# Patient Record
Sex: MALE | Race: BLACK OR AFRICAN AMERICAN | NOT HISPANIC OR LATINO | URBAN - METROPOLITAN AREA
[De-identification: names, ages, dates, MRNs, and addresses within clinical notes are randomized per-mention and may not be internally consistent; named-entity substitution may affect disease eponyms.]

---
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
## 2020-01-06 ENCOUNTER — COMMUNICATION - HEALTHEAST (OUTPATIENT)
Dept: SCHEDULING | Facility: CLINIC | Age: 20
End: 2020-01-06

## 2021-06-05 NOTE — TELEPHONE ENCOUNTER
"Pt's girlfriend Emmanuel reports pt is having severe shakes and \"sweating bullets\". No history on pt. Pt does not have PCP per Emmanuel. Emmanuel sounds upset to Writer and does not know what to do.     See Care Advice and disposition.     Emmanuel agrees to plan.     Reason for Disposition    Sounds like a life-threatening emergency to the triager    Protocols used: [id number]      "

## 2021-06-13 NOTE — PROGRESS NOTES
Late Entry- Note for the week of 10/23/17-10/27/17    Weekly Progress Note  Lena Cortés  2000  [id number]      D) Pt attended 2 groups  this week with 0 absences. A) Staff facilitated groups and reviewed tx progress. R) Patient has not yet received his treatment plan as he began programming this week. He will be presented with his treatment plan within 7 days of his intake. Pt working on the following dimensions:      Dimension #1 - Withdrawal Potential - Risk 0. No Concern.  Specific goals from treatment plan addressed this week:  Patient to remain abstinent.   Effectiveness of strategies:  Patient began group this week, but has not reported and relapses or use that was not reported on his intake/assessment.       Dimension #2 - Biomedical - Risk 0. No Concern.   Specific goals from treatment plan addressed this week:  Patient to maintain his good health.   Effectiveness of strategies:  Patient appears to be in good physical health and functioning at this time.   He has not reported any health concerns at this time.   Patient continues to be under the care of [facility].       Dimension #3 - Emotional/Behavioral/Cognitive - Risk 1. Mild concern. .  Specific goals from treatment plan addressed this week:  Patient has not received his treatment plan, and so does not have any assignments to complete in this dimension.   He was able to maintain appropriate behavior throughout both groups this week.   Effectiveness of strategies:  NA      Dimension #4 - Treatment Acceptance/Resistance - Risk 1. Mild Concern.   Specific goals from treatment plan addressed this week:  Patient to attend 2 groups per week, for the full time provided.   Patient to actively engage in group.   Effectiveness of strategies:  Patient attended both groups this week, for the full time both days.   Patient was attentive and actively engaged in the group discussion and video. He does at times show that he does not want to be in group,  but is able to engage and participate despite this.       Dimension #5 - Relapse Potential - Risk 3. Serious Concern.   Specific goals from treatment plan addressed this week:  Patient has not yet received his treatment plan, and so does not yet have any assignments to complete in this dimension.   Effectiveness of strategies:  NA      Dimension #6 - Recovery Environment - Risk 3. Serious Concern.   Specific goals from treatment plan addressed this week:  Patient has not yet received his treatment plan, and so does not yet have any assignments to complete in this dimension.   Effectiveness of strategies:  NA      T) Treatment plan updated NA.  Patient notified and in agreement NA.  Patient educated on Drug Categories & [employer]; Heroin, [employer] Story video. Patient has completed 4 program hours at this time. Projected discharge date is 2/2/18.     KRISTEN Nye  10/27/2017      Psycho-Educational Curriculum Date Attended Psycho-Educational Curriculum Date Attended   Acceptance                   Mental Health                            Relapse          Sober Structure            Medical Aspects        Drug Categories 10/24/17                       Educational Videos        Turning Point       NG: Heroin; [employer] Story 10/27/17      NG: Marijuana        NG: Cocaine     Relationships   NG: Ecstasy       On the Outs        Which Brain Do You Want        DUI: Dead in 5 Seconds       Intervention:        Intervention:       Intervention:        Intervention:        20/20: A Deadly Drunk Driving Accident        Drunk in Public     Feelings   Addiction      20/20: Intoxication Nation        Unguarded        NG: World's Most Dangerous Drug

## 2021-06-13 NOTE — PROGRESS NOTES
Met with patient individually. He received his treatment plan, and signed with no changes made.    KRISTEN Nye  10/31/2017

## 2021-06-13 NOTE — PROGRESS NOTES
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 Always supportive   Child(veronica)    Other relative(s) Always supportive   Friend(s)/neighbor(s) Never supportive   Child(veronica) s father(s)/mother(s)    Support group member(s)    Community of jessenia members    /counselor/therapist/healer Always supportive   Other (specify) Probation Always supportive     8A. What is your current living situation?       Patient is currently placed at the [facility] in the [facility] program.      8B. What is your long term plan for where you will be living?      Patient plans to live with his mother and younger brother.     8C. Tell me about your living environment/neighborhood? Ask enough follow up questions to determine safety, criminal activity, availability of alcohol and drugs, supportive or antagonistic to the person making changes.       He reports that he was living in [facility] where crime and substance use are prevalent.     9. Criminal justice history: Gather current/recent history and any significant history related to substance use--Arrests? Convictions? Circumstances? Alcohol or drug involvement? Sentences? Still on probation or parole? Expectations of the court? Current court order? Any sex offenses - lifetime? What level? (DSM)       aggravated robbery, and criminal sexual misconduct.     10.  What obstacles exist to participating in treatment? (Time off work, childcare, funding, transportation, pending longterm time, living situation)       NA    Dimension VI Ratings   Recovery environment - The placing authority must use the criteria in Dimension VI to determine a client s recovery environment.   RISK DESCRIPTIONS - Severity rating: 3-Patient is not engaged in structured, meaningful activity and the patient's peers, family , significant other, and living environment are unsupportive, or there is significant criminal justice system involvement.    REASONS SEVERITY WAS ASSIGNED - Patient was not regularly attending school in the community, and so he appears to lack structured and meaningful activity as he reports that most of his time was spent using or selling drugs. Patient is currently in a court ordered out of home placement at the [facility] due to his offense. He will remain on probation following his discharge from the program. Patient reports that his mother is supportive of him being sober, but he does not appear to have any positive peer support at this time as most of his friends use as well as his brother and cousin. Patient also reports that he as living in an area of high crime and use.          Client Choice/Exceptions     Would you like services specific to language, age, gender, culture, Oriental orthodox preference, race, ethnicity, sexual orientation or disability?  yes    If yes, specify:  Client is open to diverse groups.      What particular treatment choices and options would you like to have?  male, Adolescent     Do you have a preference for a particular treatment program?  [facility]    DSM-V Criteria for Substance Abuse  Instructions  Determine whether the client currently meets the criteria for a Substance Use Disorder using the diagnostic criteria in the DSM-V, pp. 481-589. Current means during the most recent 12 months outside a facility that controls access to substances.    Category of  substance Severity ICD-10 Code/DSM V Code   []  Alcohol Use Disorder [] Mild  [] Moderate  [] Severe [] (F10.10) (305.00)  [] (F10.20) (303.90)  [] (F10.20) (303.90)   [x]  Cannabis Use Disorder [] Mild  [] Moderate  [x] Severe [] (F12.10) (305.20)  [] (F12.20) (304.30)  [x] (F12.20) (304.30)   [] Hallucinogen Use Disorder [] Mild  [] Moderate  [] Severe [] (F16.10) (305.30)  [] (F16.20) (304.50)  [] (F16.20) (304.50)   []  Inhalant Use Disorder [] Mild  [] Moderate  [] Severe [] (F18.10) (305.90)  [] (F18.20) (304.60)  [] (F18.20) (304.60)   []  Opioid Use Disorder [] Mild  [] Moderate  [] Severe [] (F11.10) (305.50)  [] (F11.20) (304.00)  [] (F11.20) (304.00)   []  Sedative, Hypnotic, or Anxiolytic Use Disorder [] Mild  [] Moderate  [] Severe [] (F13.10) (305.40)   [] (F13.20) (304.10)  [] (F13.20) (304.10)   []  Stimulant Related Disorders [] Mild  [] Moderate  [] Severe [] (F15.10) (305.70) Amphetamine type substance  [] (F14.10) (305.60) Cocaine  [] (F15.10) (305.70) Other or unspecified stimulant  [] (F15.20) (304.40) Amphetamine type substance  [] (F14.20) (304.20) Cocaine  [] (F15.20) (304.40) Other or unspecified stimulant  [] (F15.20) (304.40) Amphetamine type substance  [] (F14.20) (304.20) Cocaine  [] (F15.20) (304.40) Other or unspecified stimulant   []  Tobacco use Disorder [] Mild  [] Moderate  [] Severe [] (Z72.0) (305.1)  [] (F17.200) (305.1)  [] (F17.200) (305.1)   []  Other (or unknown) Substance Use Disorder [] Mild  [] Moderate  [] Severe [] (F19.10) (305.90)  [] (F19.20) (304.90)  [] (F19.20) (304.90)       Suggested Level of Care Necessary for Recovery  []  Inpatient  []  Extended Care []  Residential [x]  Outpatient  []  None      Collateral Contact Summary   Number of contacts made:  2  Contact with referring person:  yes     If court related records were reviewed, summarize here:  Patient does not have any UAs on file. The information presented in his YLS indicates low risk for substance  use however it also indicates no substance use which contradicts patient and collateral report.      [x]   Information from collateral contacts supported/largely agreed with information from the client and associated risk ratings.   []   Information from collateral contacts was significantly different from information from the client and lead to different risk ratings.      Summarize new information here:      Rule 25 Assessment Summary and Plan   's Recommendation    Patient meets DSM IV criteria for Cannabis Use Disorder, Severe (F12.20).  Recommendation is for this patient to attend the [facility] program while at the [facility] and follow aftercare recommendations once programming is complete.      Collateral Contacts     Please duplicate this page for each contact.  If this includes information which is sensitive and not public, separate this page from the rest of the assessment before sharing.  Retain the page in the assessment file.   Name    Misty Thaissantiago Relationship    SW/Primary Therapist @ [facility] Phone Number    [phone] Releases    yes       Information Provided:      Misty reported the following:    Patient reported his first use of marijuana at age 12.   By age 16 the patient reports that he was smoking 1 blunt per day.   By age 17 he reports smoking 2 blunts per day, and more if he was with friends.   Marijuana use may have contributed to his offending behavior and other criminal behaviors, which the patient lacks insight into.   Believes that the patient could benefit from the creation of specific and structured plan for how to abstain from substance use when he is released from the [facility].     Collateral Contacts     Name    Ignacia Lovell   Relationship    Mother Phone Number    [phone] Releases    yes       Information Provided:      Ignacia reported the following:    She is aware that the patient was using marijuana.   She is unsure of how much or how often he was using.   She  expressed no other concerns in regards to his substance use at this time.          Staff Name and Title:  [facility]  Date: 10/18/2017  Time:  10:15 AM

## 2021-06-13 NOTE — PROGRESS NOTES
Addiction Services - Initial Services Plan   Name:Lena Cortés   : 2000   MRN: [id number]       Patient describes their immediate need: To learn sober living skills to prevent relapse. Patient denies any other immediate needs at this time.     Are there any immediate Safety Needs such as (physical, stability, mobility): Patient is currently under the care of [facility]. Pt is able to get medical care as needed. Pt denies immediate concerns.     Immediate Health Needs and Plan:   Remain clean and sober and attend first group therapy session on 10/24/17.    Vulnerable Adult: NA     [ ] Continue Current Medications for:    [ ] Request Consult for:   [ ] Notify Attending Physician about:   [ ] Other:     Issues to be addressed i the first sessions:   Treatment planning, orientation to group norms and rules, and welcomed by peers. Complete Substance Use History packet, and new group member worksheet.     Patient strengths and needs:   Strengths identified as caring, respectful, hard working, good sense of style, sense of humor.   Needs identified as communication, anger, and letting go of things.     Plan for patient for time between intake and completion of the treatment plan:   Attend all group therapy sessions as directed, complete all written and oral assignments as directed, and remain clean and sober. A relapse, if any, must be reported to staff immediately in order to ensure you are receiving the proper level of care.    Staff Members' Titles authorized to Initiate Services are:   Director of Behavioral Service   Clinical Director of Chemical Dependency   [facility] Counselor   MI/CD Counselor        Vulnerable Adult Review   [X] Review of the facility Abuse Prevention plan was reviewed with the patient   [X] No individual abuse plan is necessary   [ ] In addition to the facility Abuse Prevention plan, an Individual Abuse Plan will be put in place     I understand these goals to be the  Treatment Goals of the Program, and I agree to the stated Methods in attempting to accomplish these goals.     Patient Signature: _________________________Date: 10/23/2017       Staff Name/Title: KIRSTEN Nye Date: 10/23/2017   Time: 1:52 PM

## 2021-06-13 NOTE — PROGRESS NOTES
"Intake Note:   D) Lena Cortés is a 17 y.o.  single Black or  male who is referred to AIOP via WILL Pelaez/Primary Therapist at [facility], with funding from [facility]. Patient orientated x 3. Patient meets criteria for Cannabis Use Disorder, Severe (F12.20).  Patient appears appropriate for AIOP.   A) Met with patient for 40 minutes.  Completed intake assessment and preliminary paperwork. Patient was given and explained counselor & supervisor license number and contact info, Patient Bill of Rights, program rules/regulations, Program Abuse Prevention Plan, confidentiality & HIPPA policies, grievance procedure, presented ROIs, TB & HIV/AIDS policies & resources, and Vulnerable Adult policy.   Conducted Vulnerable Adult Assessment.   R)No special Vulnerable Adult needed at this time.  Patient signed and agreed to counselor & supervisor license number and contact info., Patient Bill of Rights, group rules/regulations, Program Abuse Prevention Plan, confidentiality & HIPPA policies, grievance procedure,  ROIs, TB & HIV/AIDS policies & resources, and Vulnerable Adult policy. Patient scored Lower Risk on PANSI screen. Patient denied suicidal ideation/intent/plan/means at this time.     Opioid Use Disorder: No   Provided \"Options for Opioid Treatment in [place] and Overdose Prevention\" No     Dimension #1 - Withdrawal Potential - Risk 0. No Concern.   Patient reports a history of daily marijuana & nicotine use, with his last reported use being on 3/9/17, the day of his out of home placement. He denies any current withdrawal symptoms, and does not appear to be at risk of withdrawal at this time.     Dimension #2 - Biomedical - Risk 0. No Concern.   Patient is currently under the care of [facility] medical unit for all medical issues. Patient denies any health concerns at this time. He appears to be in good physical health and functioning for a young man of his age. "     Dimension #3 - Emotional , Behavioral and Cognitive - Risk 1. Mild Concern.    Patient denies any mental health diagnosis at this time. He does however appear to greatly struggle with impulse control as evidence by his offense that led to his out of home placement. He denies any mental health medications for services at this time. Patient denies any suicidal thoughts or plans. He denies symptoms of mental health. Per his records the only mental health disorder he may have is a conduct disorder. He has been working to meet program expectations while at the [facility] [employer], and his behaviors have been improving over time. Patient is oriented x4.     Dimension #4 - Treatment Resistance - Risk 1. Mild Concern.   Patient reports that he does not believe that he has a problem with substance use, but that he is willing to stop using at this time. He therefore appears ambivalent at this time about his need for change. He was calm and cooperative throughout this intake, and it appears that he was honest and open with the information that he has presented throughout his intake and assessment. He appears motivated at this time solely due to external factors such as probation.     Dimension #5 - Relapse Potential - Risk 3. Serious Concern.  Patient appears to lack insight into his use, and he is unable to recognize at this time the connection that may exist between his criminal behavior and his substance use. He reports no treatment experience which may indicate a lack of knowledge and understanding about substance use and recovery. He appears to lack the necessary coping skills to prevent relapse at this time, and his ambivalence about change may indicate an increased risk of relapse at this time.     Dimension #6 - Recovery Environment - Risk 3. Serious Concern.   Patient was not regularly attending school in the community, and so he appears to lack structured and meaningful activity as he reports that most of his time was spent  using or selling drugs. Patient is currently in a court ordered out of home placement at the [facility] due to his offense. He will remain on probation following his discharge from the program. Patient reports that his mother is supportive of him being sober, but he does not appear to have any positive peer support at this time as most of his friends use as well as his brother and cousin. Patient also reports that he as living in an area of high crime and use.   T) Explained counselor & supervisor license number and contact info, Patient Bill of Rights, program rules/regulations, Probation Abuse Prevention Plan, confidentiality & HIPPA policies, grievance procedure, presented ROIs, TB & HIV/AIDS policies & resources, and Vulnerable Adult policy. Patient expected to start group on 10/24/17.      KRISTEN Nye  10/23/2017, 1:53 PM

## 2021-06-13 NOTE — PROGRESS NOTES
Individual Treatment Plan    Patient  Name: Lena Cortés  MRN: [id number]   : 2000  Admit Date: 10/23/17  Date of Initial Service Plan: 10/23/17  Tentative Discharge Date: 18    Counselor: KRISTEN Nye      Dimension 1: Acute Intoxication/Withdrawal Potential, Risk level: 0    Problem: Patient denies any withdrawal symptoms, and his last reported use was on 3/9/17  Goal: Patient to remain abstinent while in treatment and at the [facility].   Must be reached to complete treatment? Yes  Methods/Strategies (must include amount and frequency): Patient will report any use/relapse that happens immediately.   Target Date: 18  Completion Date:       Dimension 2: Biomedical Conditions/Complications, Risk level: 0    Problem: Patient denies any medical concerns and appears to be in good physical health.   Goal: Patient to maintain his good health.  Must be reached to complete treatment? No  Methods/Strategies (must include amount and frequency): Patient will practice proper diet, hygiene, sleeping, and exercise habits daily in order to maintain his health.   Target Date: 18  Completion Date:      Problem: Patient reports a history of daily nicotine use.   Goal: Patient will gain information about smoking cessation.  Must be reached to complete treatment? Yes  Methods/Strategies (must include amount and frequency): Patient will receive educational information about smoking cessation and the benefits.   Target Date: 18  Completion Date:         Dimension 3: Emotional/Behavioral/Cognitive, Risk level: 1    Problem: Patient appears to struggle with healthy relationships.  Goal: Patient will gain skills to help him identify and develop healthy relationships.   Must be reached to complete treatment? Yes  Methods/Strategies (must include amount and frequency): Patient will complete PERSONAL RELATIONSHIPS workbook, and share with his counselor.   Target Date: 11/10/17  Completion Date:     Problem: Patient  identified that he struggles with his anger.   Goal: Patient will gain knowledge and skills to help effectively manage his anger.   Must be reached to complete treatment? Yes  Methods/Strategies (must include amount and frequency): Patient will complete ANGER AND OTHER FEELINGS workbook, and share with his counselor.   Target Date: 11/17/17  Completion Date:     Problem: Patient struggles with responsible decision making.   Goal: Patient will gain skills to help him make more responsible decisions in his life.   Must be reached to complete treatment? Yes  Methods/Strategies (must include amount and frequency): Patient will complete THINKING ERRORS workbook, and share with his counselor.   Target Date: 12/1/17  Completion Date:       Dimension 4: Readiness to Change, Risk level 1    Problem: Patient appears hesitant to attend CD group.   Goal: Patient will demonstrate his willingness to attend and participate in group.   Must be reached to complete treatment? Yes  Methods/Strategies (must include amount and frequency): Patient will attend 2 groups per week, lasting approximately 1.5 hours per time, for 10 weeks, and actively participate.   Target Date: 2/2/18  Completion Date:       Dimension 5: Relapse/Continued Use/Continued Problem Potential, Risk level: 3    Problem: Patient lacks coping skills to help him prevent relapse.   Goal: Patient will gain coping skills to help him prevent relapse in the future.   Must be reached to complete treatment? Yes  Methods/Strategies (must include amount and frequency): Patient will complete HEALTHY COPING SKILLS worksheet, and share with his counselor.   Target Date: 12/15/17  Completion Date:     Problem: Patient lacks a plan for how he will maintain his sobriety following [facility].   Goal: Patient will develop a plan for how he will maintain his sobriety in the future.   Must be reached to complete treatment? Yes  Methods/Strategies (must include amount and frequency): Patient  will complete INDIVIDUAL CHANGE PLAN workbook, and share with his counselor.   Target Date: 1/26/17  Completion Date:     Dimension 6: Recovery Environment, Risk level: 3    Problem: Family involvement in treatment  Goal: Patient will involve his family in his CD treatment.   Must be reached to complete treatment? Yes  Methods/Strategies (must include amount and frequency): Patient will attend and participate in all required staffings (mid-point, pre-release, etc) and update his family on his progress in CD.   Target Date: 2/2/18  Completion Date:   Methods/Strategies (must include amount and frequency): Patient will complete FAMILY LETTER and share with his counselor.   Target Date: 12/29/17  Completion Date:     Problem: Coordination with the school system to address academic needs  Goal: Patient to work to meet educational needs while at [facility], and develop a plan for his return to the community.   Must be reached to complete treatment? Yes  Methods/Strategies (must include amount and frequency): Patient will attend school daily (Monday-Friday) in order to address his educational needs, and gain credits.    Target Date: 2/2/18  Completion Date:   Must be reached to complete treatment? No  Methods/Strategies (must include amount and frequency): Patient will work with his transition  to develop a plan for where he will attend school in the community upon release from [facility], and register.   Target Date: 2/2/18  Completion Date:       Problem: Plan to address leisure activities without chemical use  Goal: Patient will identify sober hobbies in his life.   Must be reached to complete treatment? Yes  Methods/Strategies (must include amount and frequency): Patient will complete BENEFITS OF HOBBIES AND ACTIVITIES IN RECOVERY, and share with his counselor.   Target Date: 1/12/17  Completion Date:         Resources  Resources to which the patient is being referred for problems when problems are to be addressed  concurrently by another provider: Misty SOLIS/Primary Therapist, [facility] Staff, [facility], Probation.      By signing this document, I am acknowledging that I was actively and directly involved in the development of my treatment plan.           Patient  Signature_________________________________________         Date__________________        Staff Signature  Graciela Lujan [facility]    Date: 10/31/2017, 12:58 PM

## 2021-06-14 NOTE — PROGRESS NOTES
Weekly Progress Note  Cortés Lena  2000  [id number]      D) Pt attended 1 groups this week with 0 absences. A) Staff facilitated groups and reviewed tx progress. R)Pt working on the following dimensions:      Dimension #1 - Withdrawal Potential - Risk 0. No Concern.  Specific goals from treatment plan addressed this week:  Patient to remain abstinent.   Effectiveness of strategies:  Patient has not reported and relapses or use since being at [facility].       Dimension #2 - Biomedical - Risk 0. No Concern.   Specific goals from treatment plan addressed this week:  Patient to maintain his good health.   Effectiveness of strategies:  Patient appears to be in good physical health and functioning at this time.   He has not reported any health concerns at this time.   Patient continues to be under the care of [facility] unit.       Dimension #3 - Emotional/Behavioral/Cognitive - Risk 1. Mild concern. .  Specific goals from treatment plan addressed this week:  Patient to demonstrate appropriate behavior and responsible decision making skills.   Effectiveness of strategies:  Patient was engaged throughout the video on marijuana.   He was attentive without issue. He had questions throughout which indicated he was paying attention.   Per report from the cottage he has been doing better to make responsible decisions, but does still have instances where he struggles to take responsibility for himself, and instances where he has been caught breaking ban with another peer in the [facility].   Overall his behavior was appropriate in group this week.       Dimension #4 - Treatment Acceptance/Resistance - Risk 1. Mild Concern.   Specific goals from treatment plan addressed this week:  Patient to attend 2 groups per week, for the full time provided.   Patient to actively engage in group.   Effectiveness of strategies:  Patient attended group this week, for the full time provided.   Patient was attentive and actively engaged in the  "group discussion, and throughout the video.   He did better this week about not making comments about not wanting to be in group. He appeared willing and committed to being in group this week. He does still struggle with appearing to have an \"I don't care\" attitude about some information presented.     Dimension #5 - Relapse Potential - Risk 3. Serious Concern.   Specific goals from treatment plan addressed this week:  Patient to gain insight into his substance use.   Effectiveness of strategies:  Patient was educated on marijuana and the effects of it on your body and brain. He appeared engaged throughout the video.He does appear to struggle to internalize any information presented to him, and is very set in the \"I don't have a problem\" mindset. He was able to acknowledge that marijuana can be addictive for some people, but he was unable to see how it has been a problem for him to this point in his life. He continues to appear as though he does not have insight into his use at this time.       Dimension #6 - Recovery Environment - Risk 3. Serious Concern.   Specific goals from treatment plan addressed this week:  Patient to work to meet educational needs.   Effectiveness of strategies:  Patient has been attending school daily while at [facility]. He has not yet begun to develop a plan for after he leaves [facility], as his discharge date is unknown at this time and is determined by his progress in the program.       T) Treatment plan updated No.  Patient notified and in agreement NA.  Patient educated on [employer]; Effects of Marijuana Video. Patient has completed 12 program hours at this time. Projected discharge date is 2/2/18.     KRISTEN Nye  11/16/2017      Psycho-Educational Curriculum Date Attended Psycho-Educational Curriculum Date Attended   Acceptance        Diagnostic Criteria 10/31/17      Progression 11/7/17 Mental Health                            Relapse          Sober Structure       Levels " of Care 11/7/17   Medical Aspects        Drug Categories 10/24/17                       Educational Videos        Turning Point       NG: Heroin; Jose's Story 10/27/17      NG: Marijuana 11/14/17       NG: Cocaine     Relationships   NG: Ecstasy       On the Outs        Which Brain Do You Want        DUI: Dead in 5 Seconds       Intervention:        Intervention:       Intervention:        Intervention:        20/20: A Deadly Drunk Driving Accident        Drunk in Public     Feelings   Addiction   Stress BINGO 11/10/17 20/20: Intoxication Nation        Unguarded        NG: World's Most Dangerous Drug

## 2021-06-14 NOTE — PROGRESS NOTES
Late Entry- Note for the week of 10/30/17-11/3/17    Weekly Progress Note  Joan Cortésteresa  2000  [id number]      D) Pt attended 1 group  this week with 0 absences. A) Staff facilitated groups and reviewed tx progress. R) Patient received his treatment plan following group this week. Pt working on the following dimensions:      Dimension #1 - Withdrawal Potential - Risk 0. No Concern.  Specific goals from treatment plan addressed this week:  Patient to remain abstinent.   Effectiveness of strategies:  Patient began group this week, but has not reported and relapses or use that was not reported on his intake/assessment.       Dimension #2 - Biomedical - Risk 0. No Concern.   Specific goals from treatment plan addressed this week:  Patient to maintain his good health.   Effectiveness of strategies:  Patient appears to be in good physical health and functioning at this time.   He has not reported any health concerns at this time.   Patient continues to be under the care of [facility].       Dimension #3 - Emotional/Behavioral/Cognitive - Risk 1. Mild concern. .  Specific goals from treatment plan addressed this week:  NA- Patient has just received his treatment plan, and so has not yet begun to work treatment plan methods.   He was able to maintain appropriate behavior throughout group this week.   Effectiveness of strategies:  NA      Dimension #4 - Treatment Acceptance/Resistance - Risk 1. Mild Concern.   Specific goals from treatment plan addressed this week:  Patient to attend 2 groups per week, for the full time provided.   Patient to actively engage in group.   Effectiveness of strategies:  Patient attended group this week, for the full time.   Patient was attentive and actively engaged in the group discussion. He does at times show that he does not want to be in group, but is able to engage and participate despite this. He also did struggle this week with engaging with another peer in attempting to  make jokes about a third peer.       Dimension #5 - Relapse Potential - Risk 3. Serious Concern.   Specific goals from treatment plan addressed this week:  NA- Patient has just received his treatment plan, and so has not yet begun to work treatment plan methods.   Effectiveness of strategies:  NA      Dimension #6 - Recovery Environment - Risk 3. Serious Concern.   Specific goals from treatment plan addressed this week:  NA- Patient has just received his treatment plan, and so has not yet begun to work treatment plan methods.   Effectiveness of strategies:  NA      T) Treatment plan updated NA.  Patient notified and in agreement NA.  Patient educated on Diagnostic Criteria. Patient has completed 6 program hours at this time. Projected discharge date is 2/2/18.     KRISTEN Nye  11/10/2017      Psycho-Educational Curriculum Date Attended Psycho-Educational Curriculum Date Attended   Acceptance        Diagnostic Criteria 10/31/17         Mental Health                            Relapse          Sober Structure            Medical Aspects        Drug Categories 10/24/17                       Educational Videos        Turning Point       NG: Heroin; Jose's Story 10/27/17      NG: Marijuana        NG: Cocaine     Relationships   NG: Ecstasy       On the Outs        Which Brain Do You Want        DUI: Dead in 5 Seconds       Intervention:        Intervention:       Intervention:        Intervention:        20/20: A Deadly Drunk Driving Accident        Drunk in Public     Feelings   Addiction      20/20: Intoxication Nation        Unguarded        NG: World's Most Dangerous Drug

## 2021-06-14 NOTE — PROGRESS NOTES
[facility] SUBSTANCE USE DISORDER  DISCHARGE SUMMARY            NAME:  Lena Cortés   Physician:  Dr. Goss   MRN:  [id number] :  [facility]   SS#:  [id number] Funding Source:  [facility] Funding   Admit Date: 10/23/17 Discharge Date: 12/15/17     :  2000 Hours Completed: 22   Initial Diagnosis:  Cannabis Use Disorder, Severe (F12.20) Final Diagnosis:  Cannabis Use Disorder, Severe (F12.20), In Early Remission- In a Controlled Environment   Discharge Address:    [address] [place] [id number]      Discharge Type:  Premature with Staff Approval (PWSA)    Reasons for and circumstances of service termination:  Patient was terminated from the AFSHS program at [facility] due to lack of progress and behavior. He therefore is being discharged from treatment services. He is being discharged prematurely with staff approval as he had previously been doing okay while in group. He had completed some of his assignments, but did not complete all of them. He has not successfully completed the program as his time was cut short, but his time and participation in the program was acceptable while he was present.      Dimension/Course of Treatment/Individualized Care:   1.  Withdrawal Potential - Risk level -  0  Treatment plan goals and progress towards those goals:  Patient's last reported use was on 3/9/17. He has remained abstinent throughout his time at [facility], and has reported no use or relapses. He showed no physical signs or symptoms of withdrawal, nor has he endorsed any symptoms of withdrawal. He does not appear to be at risk of withdrawal upon his discharge.        2.  Biomedical Conditions and Complications - Risk level -  0  Treatment plan goals and progress towards those goals:  Patient appears to have remained in good physical health and functioning throughout his time in treatment. He expressed no new or worsening concerns and was under the care of  [facility] for his health needs during his time at [facility]. He received educational information and material on smoking cessation and the benefits during his time in group.        3.  Emotional/Behavioral/Cognitive Conditions and Complications - Risk level -  2  Treatment plan goals and progress towards those goals:  Patient denies any mental health diagnoses. He does appear to continue to struggle greatly with appropriate behavior as evidence by his lack of progress throughout his time in the AFS program, which ultimately lead to his termination from the program. Patient was generally able to maintain appropriate behavior while in group, but did struggle at times with making fun of peers in group, and some intimidation toward other peers to get information from them. Patient was able to complete his personal relationships workbook, where he shared some resentment toward his family as he feels that he is in trouble because of them. He appears to have been honest in completing this but does appear to have rushed to get it done. Patient also completed his anger workbook, which again, he appears to have rushed through. He did not yet complete his thinking errors workbook. Patient did appear to struggle with personal responsibility and accountability, and often attempted to shift blame to others when possible. Patient has denied any suicidal thoughts or plans throughout his time in treatment.        4.  Treatment Acceptance/Resistance - Risk Level -  1  Treatment plan goals and progress towards those goals:  Patient attended all groups provided to him for the full time provided. He was generally engaged in group, but was often somewhat distant or distracted. He did appear engaged in group discussions and activities, but was often quiet. He began his time in group verbalizing that he did not want to be in group, but was able to maintain appropriate behavior overall.   He did complete some of his assignments  during his time in group, but had not yet completed them all. He was somewhat minimal in his completion, but did appear to have put some effort into them.   Throughout his time in group, he consistently stated that he did not believe that he had a substance use problem, and appeared very resistant to change, and information that suggested that his use may have impacted his life negatively. He appears to be motivated for treatment solely by external factors such as his legal involvement. It does not appear that he believes that he needs to make any changes at this time. He was however able to maintain his behavior while in group.        5.  Relapse/Continued Use/Continued Problem Potential - Risk level -  3  Treatment plan goals and progress towards those goals:  Patient continually stated that he did not believe that he had a problem with substance use and that his substance use has never had a negative impact on his life. This denial may indicate that he continues to be at an increased risk of relapse upon return to the community. He was able to complete his health coping skills worksheet, and so may have gained some coping skills that he can utilize in the community, but he has not had experience utilizing these skills in real life situations. He did not complete his individual change plan, and so does not appear to have developed a plan for how he will work to remain sober in the future. He appears to lack some insight into his use, and does not appear to have a completely realistic view of how his use did or did not impact his life in the past.        6.  Recovery Environment - Risk level -  3  Treatment plan goals and progress towards those goals:  Patient was terminated from the AFSHS program at [facility] due to lack of effort and progress in the program. He has been attending school while at [facility] in order to meet his educational needs. He had not developed a plan for his education in the community upon his  "termination as he will be remaining incarcerated for the time being. He was able to complete his letter to his family, although it was minimal, and appears to be somewhat superficial, as he wrote in another assignment that his family is \"not his family anymore\" as he feels that he is in trouble because of them. It is unclear if he has family support at this time. He does not appear to have a supportive peer group at this time, or structure prosocial activity. He was able to complete his benefits of hobbies and activities in recovery and was able to identify that he would like to become more involved in sports again, and that he would like his family to be a support for him in the future. Patient will remain on probation for the foreseeable future.         Strengths: strong-willed, sociable, able to communicate his needs, able to maintain appropriate group behavior, sense of humor.     Needs: effective anger management, ability to let go of things and move on, integrity, honesty, willingness to make changes, openness, ability to be challenged on thinking, sober and supportive peer support, pro-social activity, structure.      Services Provided: Intake, assessment, treatment planning, education, group discussion, film, lectures, 1x1 therapy, and recommendations at discharge.            Program Involvement: Fair  Attendance: Excellent  Ability to relate in group/   Other program activities: Fair  Assignment Completion: Poor  Overall Behavior: Fair  Reported Family/Significant   Other Involvement: Unknown    Prognosis: Guarded      Recommendations   If chemical dependency services are available at the patient's next placement, he may benefit from additional services as he was not able to complete this program due to termination of placement. If no services available in placement, patient may benefit from additional services when he returns to the community.        Abstain from all mood altering chemicals, identify and " maintain a sober network of friends and follow probation expectations including random UAs.  Engage in structured activities such as school, employment, sports, library, [employer] membership and daily exercise.    Attend 2-community sobriety meetings, in order to know where they are in case he feels he needs more support in the community.    Here are the numbers to find 12 step meetings:  AA Intergroup Office: [phone]  NA's Help Line: [phone].    If relapse should occur, call [facility] at [phone] to be reassessed.      Additional services if relapse should occur:  -[facility]-[phone]  -[facility]- [phone]  -[place] [facility]-[phone]    Mental Health Referral  Follow recommendations from [facility].    Physician Health Referral  Follow recommendations from [facility].      Counselor Name and Title:  KRISTEN Nye        Date:  12/18/2017  Time:  12:56 PM

## 2021-06-14 NOTE — PROGRESS NOTES
"Weekly Progress Note  Lena Cortés  2000  [id number]      D) Pt attended 1 group this week with 0 absences. A) Staff facilitated groups and reviewed tx progress. R)Pt working on the following dimensions:    Any significant events, defines as events that impact patients relationship with others inside and outside of treatment Yes: Cottage is currently on \"slow down\" and so all activity is very structured and minimal, due [facility]. Patient expressed dislike of the current situation in the cottage.   Indicate any changes or monitoring of physical or mental health problems No    Indicate involvement by any outside supports Yes: [employer] [employer] Staff, [facility], Probation, and [employer]  IAPP reviewed and modified as needed. NA    Dimension #1 - Withdrawal Potential - Risk 0. No Concern.  Specific goals from treatment plan addressed this week:  Patient to remain abstinent.   Effectiveness of strategies:  Patient has not reported and relapses or use since being at [facility].       Dimension #2 - Biomedical - Risk 0. No Concern.   Specific goals from treatment plan addressed this week:  Patient to maintain his good health.   Effectiveness of strategies:  Patient appears to be in good physical health and functioning at this time.   He has not reported any health concerns at this time.   Patient continues to be under the care of [facility].       Dimension #3 - Emotional/Behavioral/Cognitive - Risk 1. Mild concern. .  Specific goals from treatment plan addressed this week:  Patient to demonstrate appropriate behavior and responsible decision making skills.   Patient to gain skills to develop healthy relationships.   Patient to gain skills to help manage his anger.   Patient to gain skills to help make more responsible decisions.   Effectiveness of strategies:  Patient was engaged throughout the group discussion about relapse.   He was attentive without issue, and " "contributed to the discussion. He was able to be challenged on some of his thinking about recovery, but was very resistant to other opinions that his own.   Overall his behavior was appropriate in group this week.   Patient attempted to turn in his Personal Relationships workbook, his Anger workbook, and his Thinking Errors workbook, but none of them were fully completed as the patient attempted to skip pages. He will need to put more work into all 3 assignments before they will be considered finished.   Cottage is currently on \"Slow down.\" Per report, patient been doing okay to meet expectations, but does have his moments of struggling.       Dimension #4 - Treatment Acceptance/Resistance - Risk 1. Mild Concern.   Specific goals from treatment plan addressed this week:  Patient to attend 2 groups per week, for the full time provided.   Patient to actively engage in group.   Effectiveness of strategies:  Patient attended group this week, for the full time provided.   Patient was attentive and actively engaged in the group discussion.   He did better this week about not making comments about not wanting to be in group. He appeared willing and committed to being in group this week. He does still struggle with appearing to have an \"I don't care\" attitude about some information presented. He as also very resistant to other opinions about recovery and how it is a lifelong process. He was able to hear what was being said but it does not appear that he was open to thinking about it and trying to see another point of view.       Dimension #5 - Relapse Potential - Risk 3. Serious Concern.   Specific goals from treatment plan addressed this week:  Patient to gain insight into his substance use.   Patient to gain coping skills.   Effectiveness of strategies:  Patient was educated on relapse and some warning signs. He struggled greatly to acknowledge that marijuana has caused problems in his life, and that there may be warning " signs that he could be on the path the relapse. This resistance and denial may indicate that he continues to be at an increased risk of relapse at this time. He was engaged in the group discussion, but was resistant to others' opinions and views. He was able to listen respectfully, but did not appear open to trying to see things from another view point.   Patient completed and turned in his Healthy Coping Skills worksheet. He did well to complete this assignment, but will need to continue to work to develop his coping skills.   Patient has not yet completed his Individual Change Plan.       Dimension #6 - Recovery Environment - Risk 3. Serious Concern.   Specific goals from treatment plan addressed this week:  Patient to work to meet educational needs.   Patient to involve his family in treatment.   Patient to gain sober hobbies.   Effectiveness of strategies:  Patient has been attending school daily while at [facility]. He has not yet begun to develop a plan for after he leaves [facility], as his discharge date is unknown at this time and is determined by his progress in the program.   Patient completed his letter to his family. He was minimal in his effort to complete this, but it does appear that he put some thought into his responses.   Patient completed his Benefits of Hobbies and Activities worksheet. Again, he was somewhat minimal in completing, but was able to identify areas of his life that could benefit from sobriety, and how he plans to maintain that.       T) Treatment plan updated No.  Patient notified and in agreement NA.  Patient educated on What Relapse is. Patient has completed 14 program hours at this time. Projected discharge date is 2/2/18.     KRISTEN Nye  11/22/2017      Psycho-Educational Curriculum Date Attended Psycho-Educational Curriculum Date Attended   Acceptance        Diagnostic Criteria 10/31/17      Progression 11/7/17 Mental Health                            Relapse       What is  Relapse 11/21/17 Sober Structure       Levels of Care 11/7/17   Medical Aspects        Drug Categories 10/24/17                       Educational Videos        Turning Point       NG: Heroin; Jose's Story 10/27/17      NG: Marijuana 11/14/17       NG: Cocaine     Relationships   NG: Ecstasy       On the Outs        Which Brain Do You Want        DUI: Dead in 5 Seconds       Intervention:        Intervention:       Intervention:        Intervention:        20/20: A Deadly Drunk Driving Accident        Drunk in Public     Feelings   Addiction   Stress BINGO 11/10/17 20/20: Intoxication Nation        Unguarded        NG: World's Most Dangerous Drug

## 2021-06-14 NOTE — PROGRESS NOTES
Patient was terminated from [facility] today. Therefore, the decision is being made to discharge him from treatment.     KRISTEN Nye   12/15/2017  12:49 PM

## 2021-06-14 NOTE — PROGRESS NOTES
Weekly Progress Note  Lena Cortés  2000  [id number]      D) Pt attended 2 groups this week with 0 absences. A) Staff facilitated groups and reviewed tx progress. R)Pt working on the following dimensions:      Dimension #1 - Withdrawal Potential - Risk 0. No Concern.  Specific goals from treatment plan addressed this week:  Patient to remain abstinent.   Effectiveness of strategies:  Patient has not reported and relapses or use since being at [facility].       Dimension #2 - Biomedical - Risk 0. No Concern.   Specific goals from treatment plan addressed this week:  Patient to maintain his good health.   Effectiveness of strategies:  Patient appears to be in good physical health and functioning at this time.   He has not reported any health concerns at this time.   Patient continues to be under the care of [facility].       Dimension #3 - Emotional/Behavioral/Cognitive - Risk 1. Mild concern. .  Specific goals from treatment plan addressed this week:  Patient to gain skills to help manage feelings he may experience.   Effectiveness of strategies:  Patient was engaged in playing stress BINGO this week.   He was rude and demanding at the beginning of his second group as he was upset about not getting to watch a video. He was able to move past that and engage in playing BINGO for the rest of group.   He was able to maintain appropriate behavior throughout group this week other than the incident listed above.       Dimension #4 - Treatment Acceptance/Resistance - Risk 1. Mild Concern.   Specific goals from treatment plan addressed this week:  Patient to attend 2 groups per week, for the full time provided.   Patient to actively engage in group.   Effectiveness of strategies:  Patient attended both groups this week, for the full time provided.   Patient was attentive and actively engaged in the group discussion. He does at times show that he does not want to be in group, but is able to engage and participate  despite this. He continued to struggle this week with engaging with another peer in attempting to make jokes about a third peer.       Dimension #5 - Relapse Potential - Risk 3. Serious Concern.   Specific goals from treatment plan addressed this week:  None  Effectiveness of strategies:  NA  Patient was able to engage in the conversation about progression. He was able to verbalize how substance use has impacted his life and caused him trouble. He was an active participant in that group.       Dimension #6 - Recovery Environment - Risk 3. Serious Concern.   Specific goals from treatment plan addressed this week:  Patient to work to meet educational needs.   Effectiveness of strategies:  Patient has been attending school daily while at [facility]. He has not yet begun to develop a plan for after he leaves [facility], as his discharge date is unknown at this time and is determined by his progress in the program.       T) Treatment plan updated NA.  Patient notified and in agreement NA.  Patient educated on Progression of Substance Use, Levels of Care, & Stress BINGO. Patient has completed 10 program hours at this time. Projected discharge date is 2/2/18.     KRISTEN Nye  11/10/2017      Psycho-Educational Curriculum Date Attended Psycho-Educational Curriculum Date Attended   Acceptance        Diagnostic Criteria 10/31/17      Progression 11/7/17 Mental Health                            Relapse          Sober Structure       Levels of Care 11/7/17   Medical Aspects        Drug Categories 10/24/17                       Educational Videos        Turning Point       NG: Heroin; Jose's Story 10/27/17      NG: Marijuana        NG: Cocaine     Relationships   NG: Ecstasy       On the Outs        Which Brain Do You Want        DUI: Dead in 5 Seconds       Intervention:        Intervention:       Intervention:        Intervention:        20/20: A Deadly Drunk Driving Accident        Drunk in Public     Feelings   Addiction    Stress [facility] 11/10/17 20/20: Intoxication [place]        Unguarded        NG: World's Most Dangerous Drug

## 2021-06-14 NOTE — PROGRESS NOTES
"Late Entry- Note for the week of 12/4/17-12/8/17    Weekly Progress Note  Lena Cortés  2000  [id number]      D) Pt attended 2 groups this week with 0 absences. A) Staff facilitated groups and reviewed tx progress. R)Pt working on the following dimensions:    Any significant events, defines as events that impact patients relationship with others inside and outside of treatment Yes: Cottage is currently on \"slow down- level 2\" and so all activity is highly structured including exercise and leisure, due to struggles with behavior and actions in the [facility]. 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 Others have stated that he has been using intimidation as a means to maintain power over others and getting others to do things for him. He remains on slow down in the cottage, and a retention hearing is being discussed due to his lack of progress in the program.    Indicate any changes or monitoring of physical or mental health problems No    Indicate involvement by any outside supports Yes: [facility] Staff, [facility], Probation, and [employer]  IAPP reviewed and modified as needed. NA    Dimension #1 - Withdrawal Potential - Risk 0. No Concern.  Specific goals from treatment plan addressed this week:  Patient to remain abstinent.   Effectiveness of strategies:  Patient has not reported any relapses or use since being at [facility].       Dimension #2 - Biomedical - Risk 0. No Concern.   Specific goals from treatment plan addressed this week:  Patient to maintain his good health.   Patient to gain information about smoking cessation.   Effectiveness of strategies:  Patient appears to be in good physical health and functioning at this time.   He has not reported any health concerns at this time.   Patient continues to be under the care of [facility] [place].   Patient has received education on smoking cessation " and the benefits of it.   Patient participated in smoking prevention [employer] in order to gain more information about the benefits of smoking prevention and cessation.   He was engaged throughout the game, but needed multiple reminders to not talk about cottage issues during group. He struggled with with. Due to the pressure he put on his peer in group to share what was going on in the cottage, his peer divulged information that others has earned privileges when the patient had not. This upset the patient and he became emotionally dysregulated for a period of time. He was, with reminders that he needed to focus, able to come back to the game and focus. He did struggle throughout the remainder of the group but was able to remain appropriate.       Dimension #3 - Emotional/Behavioral/Cognitive - Risk 1. Mild concern. .  Specific goals from treatment plan addressed this week:  Patient to demonstrate appropriate behavior and responsible decision making skills.   Patient to gain skills to develop healthy relationships.   Patient to gain skills to help manage his anger.   Patient to gain skills to help make more responsible decisions.   Effectiveness of strategies:  Patient was engaged throughout the video, the discussion that followed, as well as the game of [employer].   He was attentive throughout the video without issue, and contributed to the discussion that followed. He did appear distracted and somewhat withdrawn throughout the game of [employer], which may have been due to the information that he was able to get his peers to tell him, but he did participate without issue.   Overall his behavior was appropriate in groups this week.   Patient previously attempted to turn in his Personal Relationships workbook , his Anger workbook, and his Thinking Errors workbook, but none of them were fully completed as the patient attempted to skip pages. Assignments were given back to the patient so that his is able to complete them fully  "before turning them in again.   Cottage is currently on \"Slow down- level 2.\" Per report, patient has been doing okay to meet expectations, but has had increasing moments of struggle in following expectations and program rules. Information was presented this week that the patient has been a medeiros player in the detrimental cottage culture that has been occurring. It is reported that he has been utilizing intimidation to maintain power in the [place] over the other residents which has created a ranking system among the residents. Patient's progress in the [facility] program is also being evaluated as there is concern that he is not making the necessary effort or progress.       Dimension #4 - Treatment Acceptance/Resistance - Risk 1. Mild Concern.   Specific goals from treatment plan addressed this week:  Patient to attend 2 groups per week, for the full time provided.   Patient to actively engage in group.   Effectiveness of strategies:  Patient attended groups this week, for the full time provided.   Patient was attentive and actively engaged in the video and group discussion. He was engaged in the game, but did appear distracted and somewhat withdrawn or distant at times and had to be brought back to group.    He did not make any comments this week about not wanting to be in group. He appeared willing and committed to being in group this week.        Dimension #5 - Relapse Potential - Risk 3. Serious Concern.   Specific goals from treatment plan addressed this week:  Patient to gain insight into his substance use.   Patient to gain coping skills.   Effectiveness of strategies:  Patient was educated on the benefits of smoking prevention as well as smoking cessation.   He continues, on an ongoing basis, to deny that marijuana has caused problems in his life, and that there may be warning signs that he could be on the path the relapse. This resistance and denial may indicate that he continues to be at an increased risk of " relapse at this time.   He was engaged in the group discussion that followed the video, and was appropriate throughout the video.   Patient completed and turned in his Healthy Coping Skills worksheet. He did well to complete this assignment, but will need to continue to work to develop his coping skills.   Patient has not yet completed his Individual Change Plan.       Dimension #6 - Recovery Environment - Risk 3. Serious Concern.   Specific goals from treatment plan addressed this week:  Patient to work to meet educational needs.   Patient to involve his family in treatment.   Patient to gain sober hobbies.   Effectiveness of strategies:  Patient has been attending school daily while at [facility]. He has not yet begun to develop a plan for after he leaves [facility], as his discharge date is unknown at this time and is determined by his progress in the program.   Patient previously completed his letter to his family. He will need to continue to involve his family in treatment through family therapy with his primary [facility] therapist, as well as any staffings that may occur.   Patient previously completed his Benefits of Hobbies and Activities worksheet.       T) Treatment plan updated No.  Patient notified and in agreement NA.  Patient educated on Unguarded video, & Smoking Prevention BINGO. Patient has completed 22 program hours at this time. Projected discharge date is 2/2/18.     KRISTEN Nye  12/13/2017      Psycho-Educational Curriculum Date Attended Psycho-Educational Curriculum Date Attended   Acceptance        Diagnostic Criteria 10/31/17      Progression 11/7/17 Mental Health                            Relapse       What is Relapse 11/21/17 Sober Structure       Levels of Care 11/7/17   Medical Aspects        Drug Categories 10/24/17      The Brain 11/28/17      Jeopardy Review 11/28/17      Jeopardy 12/1/17 Educational Videos        Turning Point       NG: Heroin; Jose's Story 10/27/17      NG:  Marijuana 11/14/17       NG: Cocaine     Relationships   NG: Ecstasy       On the Outs        Which Brain Do You Want        DUI: Dead in 5 Seconds       Intervention:        Intervention:    Prevention   Intervention:     Smoking Prevention [facility] 12/8/17 Intervention:        20/20: A Deadly Drunk Driving Accident        Drunk in Public     Feelings   Addiction   Stress [facility] 11/10/17 20/20: Intoxication [place]        Unguarded 12/5/17       NG: World's Most Dangerous Drug

## 2021-06-14 NOTE — PROGRESS NOTES
"Late Entry- Note for the week of 11/27/17-12/1/17    Weekly Progress Note  Joan Cortésteresa  2000  [id number]      D) Pt attended 2 groups this week with 0 absences. A) Staff facilitated groups and reviewed tx progress. R)Pt working on the following dimensions:    Any significant events, defines as events that impact patients relationship with others inside and outside of treatment Yes: Cottage is currently on \"slow down- level 2\" and so all activity is highly structured including exercise and leisure, due [facility]    Indicate any changes or monitoring of physical or mental health problems No    Indicate involvement by any outside supports Yes: [facility] Staff, [facility], Probation, and [employer]  IAPP reviewed and modified as needed. NA    Dimension #1 - Withdrawal Potential - Risk 0. No Concern.  Specific goals from treatment plan addressed this week:  Patient to remain abstinent.   Effectiveness of strategies:  Patient has not reported any relapses or use since being at [facility].       Dimension #2 - Biomedical - Risk 0. No Concern.   Specific goals from treatment plan addressed this week:  Patient to maintain his good health.   Effectiveness of strategies:  Patient appears to be in good physical health and functioning at this time.   He has not reported any health concerns at this time.   Patient continues to be under the care of [facility].       Dimension #3 - Emotional/Behavioral/Cognitive - Risk 1. Mild concern. .  Specific goals from treatment plan addressed this week:  Patient to demonstrate appropriate behavior and responsible decision making skills.   Patient to gain skills to develop healthy relationships.   Patient to gain skills to help manage his anger.   Patient to gain skills to help make more responsible decisions.   Effectiveness of strategies:  Patient was engaged throughout the group discussion about the brain, and the group game of " "irene.   He was attentive without issue, and contributed to the discussion. He did appear distracted and somewhat withdrawn throughout the game of irene, but he did participate without issue.   Overall his behavior was appropriate in group this week.   Patient attempted, last week, to turn in his Personal Relationships workbook , his Anger workbook, and his Thinking Errors workbook, but none of them were fully completed as the patient attempted to skip pages. He will need to put more work into all 3 assignments before they will be considered finished.   Cottage is currently on \"Slow down- level 2.\" Per report, patient has been doing okay to meet expectations, but has had increasing moments of struggle in following expectations and program rules.       Dimension #4 - Treatment Acceptance/Resistance - Risk 1. Mild Concern.   Specific goals from treatment plan addressed this week:  Patient to attend 2 groups per week, for the full time provided.   Patient to actively engage in group.   Effectiveness of strategies:  Patient attended groups this week, for the full time provided.   Patient was attentive and actively engaged in the group discussion. He was engaged in the game, but did appear distracted and somewhat withdrawn or distant at times and had to be brought back to group.    He did not make any comments this week about not wanting to be in group. He appeared willing and committed to being in group this week.  He was able to separate himself from peers when they were becoming negative, and not engage in the comments.       Dimension #5 - Relapse Potential - Risk 3. Serious Concern.   Specific goals from treatment plan addressed this week:  Patient to gain insight into his substance use.   Patient to gain coping skills.   Effectiveness of strategies:  Patient was educated on the brain and an overall review of what he has learned up to this point in group.   He continues, on an ongoing basis,  to acknowledge " that marijuana has caused problems in his life, and that there may be warning signs that he could be on the path the relapse. This resistance and denial may indicate that he continues to be at an increased risk of relapse at this time.   He was engaged in the group discussion.   Patient completed and turned in his Healthy Coping Skills worksheet. He did well to complete this assignment, but will need to continue to work to develop his coping skills.   Patient has not yet completed his Individual Change Plan.       Dimension #6 - Recovery Environment - Risk 3. Serious Concern.   Specific goals from treatment plan addressed this week:  Patient to work to meet educational needs.   Patient to involve his family in treatment.   Patient to gain sober hobbies.   Effectiveness of strategies:  Patient has been attending school daily while at [facility]. He has not yet begun to develop a plan for after he leaves [facility], as his discharge date is unknown at this time and is determined by his progress in the program.   Patient has completed his letter to his family. He was minimal in his effort to complete this, but it does appear that he put some thought into his responses.   Patient has completed his Benefits of Hobbies and Activities worksheet. Again, he was somewhat minimal in completing, but was able to identify areas of his life that could benefit from sobriety, and how he plans to maintain that.       T) Treatment plan updated No.  Patient notified and in agreement NA.  Patient educated on The Brain, Jeopardy Review, & Jeopardy. Patient has completed 18 program hours at this time. Projected discharge date is 2/2/18.     KRISTEN Nye  12/6/2017      Psycho-Educational Curriculum Date Attended Psycho-Educational Curriculum Date Attended   Acceptance        Diagnostic Criteria 10/31/17      Progression 11/7/17 Mental Health                            Relapse       What is Relapse 11/21/17 Sober Structure       Levels of  Care 11/7/17   Medical Aspects        Drug Categories 10/24/17      The Brain 11/28/17      Jeopardy Review 11/28/17      Jeopardy 12/1/17 Educational Videos        Turning Point       NG: Heroin; Jose's Story 10/27/17      NG: Marijuana 11/14/17       NG: Cocaine     Relationships   NG: Ecstasy       On the Outs        Which Brain Do You Want        DUI: Dead in 5 Seconds       Intervention:        Intervention:       Intervention:        Intervention:        20/20: A Deadly Drunk Driving Accident        Drunk in Public     Feelings   Addiction   Stress BINGO 11/10/17 20/20: Intoxication [place]        Unguarded        NG: World's Most Dangerous Drug

## 2021-07-04 NOTE — PROGRESS NOTES
Rule 31 by Graciela Lujan LADC at 10/23/2017  2:00 PM     Author: Graciela Lujan LADC Service: -- Author Type: Licensed Alcohol and Drug Counselor    Filed: 10/24/2017  5:39 PM Encounter Date: 10/23/2017 Status: Signed    : Graciela Lujan LADC (Licensed Alcohol and Drug Counselor)           [facility] Assessment Summary  Date: 10/23/2017        : KRISTEN Nye    Name: Lena Cortés  Address: [address] [place] [address]  Phone: [phone] (home)   Referral Source: WILL Pelaez/Primary Therapist at [facility]  : 2000  Age: 17 y.o.  Race/Ethnicity: Black or   Marital Status: Single, Never   Employment: Full Time Student                                                                                                                       Level of Education: currently in 12th grade   Socio-economic (yearly Income) Status: NA  Sexual Orientation: heterosexual   Last 4 digits of Social Security: 0516    Is assistance required in the ability to read and understand written material?   no     Reason for seeking services:    Patient is recommended for CD based on his assessment. He was referred for his Rule 25 by his /primary therapist.   He reports that he is unsure of why is was recommended, despite his use.     Dimension I Acute intoxication/Withdrawal Potential:    Symptomology (past 12 months, check all that apply)  increased tolerance, AM use, weekly intoxication, using alone and family history of addiction    Observed or reported (withdrawal symptoms, check all that apply)  none reported or displayed    Chemical use most recent 12 months outside a facility and other significant use history (client self-report)  Primary Drug Used  Age of First Use  Most Recent Pattern of Use and Duration    Date of last use  Time if substance use in the last 30 days Withdrawal Potential? Requiring special care  Method of use   (oral,  "smoked, snort, IV, etc)    Alcohol  15 2x in his life. 1 shot of Faith per time.  Summer 2016 NA None oral   Marijuana/Hashish  12 Daily. At least 2-3 blunts per day. Up to 10+ blunts in a day 2017 NA None Smoking   Cocaine/Crack   Denies       Meth/Amphetamines   Denies       Heroin   Denies       Other Opiates/Synthetics  16 Percocet- 1x, took 1 pill 2016 NA None Oral   Inhalants   Denies       Benzodiazepines  14 Xanax- \"Dirty Sprite\"  Unsure of how many times he has done it, or the amount he used per time.     2016 NA None Oral   Hallucinogens   Denies       Barbiturates/Sedatives/Hypnotics   Denies       Over-the-Counter Drugs   Denies       Other   Denies       Nicotine  10 Daily- about 6 cigarettes per day 2017 NA None Smoking         Dimension I Risk Ratin- No Concern.    Reason Risk Rating Assigned: Patient reports a history of daily marijuana & nicotine use, with his last reported use being on 3/9/17, the day of his out of home placement. He denies any current withdrawal symptoms, and does not appear to be at risk of withdrawal at this time.         Dimension II Biomedical Conditions:    Any known health conditions: No    Ever previously treated/diagnosed with any eating disorder?  no     List Health Concerns/Conditions Reported: Denies    Are Health Concerns/Conditions being treated? Yes  By Whom? [facility], [place], operated [facility]    Are you pregnant: No OB care received:NA CPS call needed: NA        Dimension II Risk Ratin- No Concern.   Reason Risk Rating Assigned: Patient is currently under the care of [facility] for all medical issues. Patient denies any health concerns at this time. He appears to be in good physical health and functioning for a young man of his age.         Dimension III Emotional/Behavioral/Cognitive:    Oriented to person, place, time, situation?  Yes     Current Mental Health Services: yes - Receiving services from [facility], [place] " at [facility]    Past Hospitalization for MH or psychiatric problems: No    How many Hospitalizations: 0   Last Hospitalization; date and location: NA      Past or Current Issues with Gambling (Explain): no- but reports that he does shoot dice.     Prior Treatment for Gambling: No     MH Diagnoses:    Denies.     Psychiatrist: None     Clinic: [facility].       Current Psychotropic Medications:  Denies.     Taking medications as prescribed: NA   Medications Helpful:  NA     Current Suicidal Ideation: No  If yes, any plan? NA  What is plan?:   None    Previous Suicide Attempts?  No   Explain: Denies.     Current Homicidal Ideation: No  If yes, any plan? No   What is plan?: NA    Previous Homicide Attempts? No Explain: NA    Suicidal/Homicidal Ideation in last 30 days? No  Explain: Denies     Hazardous behavior engaged in which placed self or others in danger (i.e., operating a motor vehicle, unsafe sex, sharing needles, etc.)?   Fighting, riding in cars with people who are under the influence, riding in cars with people who are smoking/using while driving, criminal activity.     Family history of substance and/or mental health diagnosis/issues?  Yes  Explain: Patient reports that his brother also uses marijuana.      History of abuse (Physical, Emotional, Sexual)? No  Explain: Denies.        Dimension III Risk Ratin- Mild Concern.   Reason Risk Rating Assigned: Patient denies any mental health diagnosis at this time. He does however appear to greatly struggle with impulse control as evidence by his offense that led to his out of home placement. He denies any mental health medications for services at this time. Patient denies any suicidal thoughts or plans. He denies symptoms of mental health. Per his records the only mental health disorder he may have is a conduct disorder. He has been working to meet program expectations while at the [facility], and his behaviors have been improving over time. Patient is oriented  "x4.         Dimension IV Readiness to Change:    Mandated, or coerced into assessment or treatment:  Yes    Does client feel there is a problem:   No    Verbalization of need/desire to change:   No     Impression of : (Check all that apply):    cooperative, ambivalent about change and low motivation    Are there any spiritual, cultural, or other special needs to be addressed for client to be successful in treatment? no      Dimension IV Risk Ratin- Mild Concern.    Reason Risk Rating Assigned: Patient reports that he does not believe that he has a problem with substance use, but that he is willing to stop using at this time. He therefore appears ambivalent at this time about his need for change. He was calm and cooperative throughout this intake, and it appears that he was honest and open with the information that he has presented throughout his intake and assessment. He appears motivated at this time solely due to external factors such as probation.         Dimension V Relapse/Continued Use/Continued Problem Potential     Client age at First Treatment: NA    Lifetime # of CD Treatments:  NA  List program, dates, and status of completion (within last five years): NA    Longest Period of Abstinence: about 1 year, when he was 13-14 years old  How did you accomplish this? Playing sports.      Circumstances which led to Relapse: He reports that he returned to use after that time due to it being the end of the \"amount of time\" he designated to stop using for.     Risk Taking/Problem Behaviors Related to Use and/or Under the Influence: Fighting, riding in cars with people who are under the influence, riding in cars with people who are smoking/using while driving.      Dimension V Risk Rating: 3- Serious Concern.    Reason Risk Rating Assigned: Patient appears to lack insight into his use, and he is unable to recognize at this time the connection that may exist between his criminal behavior and his substance " use. He reports no treatment experience which may indicate a lack of knowledge and understanding about substance use and recovery. He appears to lack the necessary coping skills to prevent relapse at this time, and his ambivalence about change may indicate an increased risk of relapse at this time.         Dimension VI Recovery Environment   Family support:  Yes  Peer Sober Support:  No    Current living circumstances:  Sly in a juvenile court placement at [facility], has plans to live with his mother in the community.    Environment supportive of recovery:  No- lives in an area of high crime and use.    Specific activities participating in which do not involve substance use:  Rapping, basketball, football, spending time with family and friends.     Specific activities participating in which do involve substance use:  Selling marijuana, playing basketball and football.     People, things that threaten recovery: yes - Patient's old peer group.     Expected family involvement during treatment services:  Limited family involvement, primarily at mid-point and pre-release staffing with treatment team present    Current Legal Involvement:  Juvenile Probation    Legal Consequences related to use: aggravated robbery, and criminal sexual misconduct.     Occupational/Academic consequences related to use: Patient is currently behind in school due to not attending in the community.     Current ability to function in a work and/or education setting: yes     Current support network for recovery (including community-based recovery support): None    Do you belong to a [place]: No Which [place]? NA  Reside on reservation: NA    Dimension VI Risk Rating: 3- Serious Concern.   Reason Risk Rating Assigned: Patient was not regularly attending school in the community, and so he appears to lack structured and meaningful activity as he reports that most of his time was spent using or selling drugs. Patient is currently  in a court ordered out of home placement at the [facility] due to his offense. He will remain on probation following his discharge from the program. Patient reports that his mother is supportive of him being sober, but he does not appear to have any positive peer support at this time as most of his friends use as well as his brother and cousin. Patient also reports that he as living in an area of high crime and use.           DSM-V Criteria for Substance Abuse  Instructions:  Determine whether the client currently meets the criteria for a Substance Use Disorder using the diagnostic criteria in the  DSM-V, pp. 481-589. Current means during the most recent 12 months outside a facility that controls access to substances.    Category of substance Severity ICD-10 Code/DSM V Code  Alcohol Use Disorder Mild  Moderate  Severe (F10.10) (305.00)  (F10.20) (303.90)  (F10.20) (303.90)   Cannabis Use Disorder Mild  Moderate  Severe (F12.10) (305.20)  (F12.20) (304.30)  (F12.20) (304.30)   Hallucinogen Use Disorder Mild  Moderate  Severe (F16.10) (305.30)  (F16.20) (304.50)  (F16.20) (304.50)   Inhalant Use Disorder Mild  Moderate  Severe (F18.10) (305.90)  (F18.20) (304.60)  (F18.20) (304.60)   Opioid Use Disorder Mild  Moderate  Severe (F11.10) (305.50)  (F11.20) (304.00)  (F11.20) (304.00)   Sedative, Hypnotic, or Anxiolytic Use Disorder Mild  Moderate  Severe (F13.10) (305.40)  (F13.20) (304.10)  (F13.20) (304.10)   Stimulant Related Disorders Mild              Moderate              Severe   (F15.10) (305.70) Amphetamine type substance  (F14.10) (305.60) Cocaine  (F15.10) (305.70) Other or unspecified stimulant    (F15.20) (304.40) Amphetamine type substance  (F14.20) (304.20) Cocaine  (F15.20) (304.40) Other or unspecified stimulant    (F15.20) (304.40) Amphetamine type substance  (F14.20) (304.20) Cocaine  (F15.20) (304.40) Other or unspecified stimulant   DisorderTobacco use Disorder Mild  Moderate  Severe (Z72.0)  (305.1)  (F17.200) (305.1)  (F17.200) (305.1)   Other (or unknown) Substance Use Disorder Mild  Moderate  Severe (F19.10) (305.90)  (F19.20) (304.90)  (F19.20) (304.90)     Diagnostic Impression: Cannabis Use Disorder, Severe (12.20)    Assessment Completed Within 3 Sessions of Admission: Yes  If NO, date assessment to be completed noted in Treatment Plan:       Signature of Counselor: KRISTEN Nye  Date and Time of Signature: 10/24/2017, 4:23 PM